# Patient Record
(demographics unavailable — no encounter records)

---

## 2025-02-12 NOTE — PHYSICAL EXAM
[Chaperone Present] : A chaperone was present in the examining room during all aspects of the physical examination [32510] : A chaperone was present during the pelvic exam. [FreeTextEntry2] : Jennifer [Appropriately responsive] : appropriately responsive [Alert] : alert [No Acute Distress] : no acute distress [Soft] : soft [Non-tender] : non-tender [Non-distended] : non-distended [No HSM] : No HSM [No Lesions] : no lesions [No Mass] : no mass [Oriented x3] : oriented x3 [Examination Of The Breasts] : a normal appearance [No Masses] : no breast masses were palpable [Labia Majora] : normal [Labia Minora] : normal [Normal] : normal [Uterine Adnexae] : normal

## 2025-02-12 NOTE — HISTORY OF PRESENT ILLNESS
[Patient reported PAP Smear was normal] : Patient reported PAP Smear was normal [Menstrual Cramps] : menstrual cramps [Previously active] : previously active [Patient refuses STI testing] : Patient refuses STI testing [PapSmeardate] : 12/2023 [FreeTextEntry1] : 01/14/2025

## 2025-03-28 NOTE — REASON FOR VISIT
[Home] : at home, [unfilled] , at the time of the visit. [Medical Office: ([facility])___] : at the medical office located in  [Telehealth (audio & video)] : This visit was provided via telehealth using real-time 2-way audio visual technology. [Verbal consent obtained from patient] : the patient, [unfilled] [Follow-Up] : a follow-up evaluation of

## 2025-03-28 NOTE — PLAN
[FreeTextEntry1] :  Discussed the PCOS is a chronic medical condition characterized by irregular menses, increased testosterone and/or polycystic ovaries. This a chronic condition which can also impact metabolic health and lead to diabetes, hyperlipidemia, anemia, or endometrial cancer. Treatment is usually managed with hormonal contraceptives, spironolactone, metformin, and lifestyle changes such as nutrition management and exercise. Pt is hesitant to try CHCs because of her previous negative experience but is open to inositol supplements. She will reach out with the name of her previous CHC and will reevaluate symptoms at a later date.

## 2025-03-28 NOTE — HISTORY OF PRESENT ILLNESS
[FreeTextEntry1] : EVY SCHUSTER is a 26 year y/o female who presents for follow up of lab results for irregular painful periods and increased hair growth. She was on CHCs for about a year but still had irregular periods and cramping. She does not remember the name of the medication she was taking.